# Patient Record
Sex: FEMALE | Race: BLACK OR AFRICAN AMERICAN | NOT HISPANIC OR LATINO | Employment: FULL TIME | URBAN - METROPOLITAN AREA
[De-identification: names, ages, dates, MRNs, and addresses within clinical notes are randomized per-mention and may not be internally consistent; named-entity substitution may affect disease eponyms.]

---

## 2019-05-29 PROBLEM — R00.2 PALPITATIONS: Status: ACTIVE | Noted: 2019-05-29

## 2019-05-29 PROBLEM — F41.9 ANXIETY: Status: ACTIVE | Noted: 2019-05-29

## 2019-05-29 PROBLEM — Z86.79: Status: ACTIVE | Noted: 2019-05-29

## 2019-05-29 PROBLEM — I34.0 MITRAL VALVE INSUFFICIENCY: Status: ACTIVE | Noted: 2019-05-29

## 2019-05-29 PROBLEM — B00.9 HSV INFECTION: Status: ACTIVE | Noted: 2019-05-29

## 2019-06-03 PROBLEM — M79.641 HAND PAIN, RIGHT: Status: ACTIVE | Noted: 2019-06-03

## 2020-02-12 PROBLEM — F32.A DEPRESSION: Status: ACTIVE | Noted: 2020-02-12

## 2020-03-12 PROBLEM — Z90.710 STATUS POST TOTAL ABDOMINAL HYSTERECTOMY: Status: ACTIVE | Noted: 2018-04-16

## 2020-10-16 PROBLEM — Z98.890 STATUS POST MITRAL VALVE ANNULOPLASTY: Chronic | Status: ACTIVE | Noted: 2020-10-16

## 2020-10-16 PROBLEM — Z98.890 STATUS POST MITRAL VALVE ANNULOPLASTY: Status: ACTIVE | Noted: 2020-10-16

## 2021-04-21 DIAGNOSIS — Z11.59 NEED FOR HEPATITIS C SCREENING TEST: ICD-10-CM

## 2021-05-12 ENCOUNTER — PATIENT MESSAGE (OUTPATIENT)
Dept: RESEARCH | Facility: HOSPITAL | Age: 41
End: 2021-05-12

## 2022-02-03 PROBLEM — M79.641 HAND PAIN, RIGHT: Status: RESOLVED | Noted: 2019-06-03 | Resolved: 2022-02-03

## 2022-03-24 PROBLEM — N83.291 COMPLEX CYST OF BOTH OVARIES: Status: ACTIVE | Noted: 2022-03-24

## 2022-03-24 PROBLEM — N83.292 COMPLEX CYST OF BOTH OVARIES: Status: ACTIVE | Noted: 2022-03-24

## 2022-08-01 PROBLEM — N83.299 COMPLEX OVARIAN CYST: Status: ACTIVE | Noted: 2022-08-01

## 2023-09-20 PROBLEM — N83.291 COMPLEX CYST OF BOTH OVARIES: Status: RESOLVED | Noted: 2022-03-24 | Resolved: 2023-09-20

## 2023-09-20 PROBLEM — N83.292 COMPLEX CYST OF BOTH OVARIES: Status: RESOLVED | Noted: 2022-03-24 | Resolved: 2023-09-20

## 2023-09-20 PROBLEM — R10.2 PELVIC PAIN: Status: ACTIVE | Noted: 2023-09-20

## 2024-03-11 ENCOUNTER — PATIENT MESSAGE (OUTPATIENT)
Dept: ADMINISTRATIVE | Facility: HOSPITAL | Age: 44
End: 2024-03-11

## 2024-03-14 ENCOUNTER — PATIENT MESSAGE (OUTPATIENT)
Dept: ADMINISTRATIVE | Facility: HOSPITAL | Age: 44
End: 2024-03-14

## 2024-03-14 ENCOUNTER — PATIENT OUTREACH (OUTPATIENT)
Dept: ADMINISTRATIVE | Facility: HOSPITAL | Age: 44
End: 2024-03-14

## 2024-03-14 DIAGNOSIS — Z12.31 BREAST CANCER SCREENING BY MAMMOGRAM: Primary | ICD-10-CM

## 2024-12-12 ENCOUNTER — ON-DEMAND VIRTUAL (OUTPATIENT)
Dept: URGENT CARE | Facility: CLINIC | Age: 44
End: 2024-12-12

## 2024-12-12 DIAGNOSIS — N89.8 VAGINAL IRRITATION: Primary | ICD-10-CM

## 2024-12-12 RX ORDER — FLUCONAZOLE 150 MG/1
150 TABLET ORAL DAILY
Qty: 2 TABLET | Refills: 0 | Status: SHIPPED | OUTPATIENT
Start: 2024-12-12 | End: 2024-12-14

## 2024-12-12 NOTE — PROGRESS NOTES
Subjective:      Patient ID: Maya Farmer is a 44 y.o. female.    Vitals:  vitals were not taken for this visit.     Chief Complaint: vaginal irritation and Vaginal Discharge      Visit Type: TELE AUDIOVISUAL    Present with the patient at the time of consultation: TELEMED PRESENT WITH PATIENT: None, patient at home    Past Medical History:   Diagnosis Date    Allergy     Anemia     Anxiety     Herpes genitalis in women     Hypertension     Mitral regurgitation     Right tubal pregnancy 05/2006     Past Surgical History:   Procedure Laterality Date    ASD REPAIR      CARDIAC SURGERY      first two surgeries at age 13, last at age 31    HYSTERECTOMY  2018    secondary to bleeding with neg patho    MITRAL VALVE ANNULOPLASTY      TUBAL LIGATION      2004     Review of patient's allergies indicates:  No Known Allergies  Current Outpatient Medications on File Prior to Visit   Medication Sig Dispense Refill    acyclovir (ZOVIRAX) 400 MG tablet Take 1 tablet (400 mg total) by mouth 2 (two) times daily. 180 tablet 1    aspirin (ECOTRIN) 81 MG EC tablet Take 81 mg by mouth once daily.      cetirizine (ZYRTEC) 10 MG tablet Take 1 tablet (10 mg total) by mouth once daily. 30 tablet 11    EPINEPHrine (EPIPEN) 0.3 mg/0.3 mL AtIn Inject 0.3 mLs (0.3 mg total) into the muscle once. for 1 dose 1 each 0    fluticasone propionate (FLONASE) 50 mcg/actuation nasal spray 1 spray (50 mcg total) by Each Nostril route 2 (two) times a day. 16 g 11    gabapentin (NEURONTIN) 300 MG capsule Take 1 capsule (300 mg total) by mouth 3 (three) times daily. 90 capsule 5    ibuprofen (ADVIL,MOTRIN) 800 MG tablet Take 1 tablet (800 mg total) by mouth 3 (three) times daily as needed for Pain. 90 tablet 5    metoprolol tartrate (LOPRESSOR) 25 MG tablet Take 1 tablet (25 mg total) by mouth once daily. 90 tablet 1    norethindrone (MICRONOR) 0.35 mg tablet TAKE 1 TABLET(0.35 MG) BY MOUTH EVERY DAY 28 tablet 11    potassium chloride SA  (K-DUR,KLOR-CON) 20 MEQ tablet Take 1 tablet (20 mEq total) by mouth once daily. 30 tablet 5     No current facility-administered medications on file prior to visit.     Family History   Problem Relation Name Age of Onset    Hypertension Mother jil     Cervical cancer Mother jil     Hypertension Father demarcus     Cancer Paternal Grandmother      Breast cancer Paternal Grandmother          50's    Cancer Paternal Grandfather      Colon cancer Neg Hx      Ovarian cancer Neg Hx      Endometrial cancer Neg Hx      Pancreatic cancer Neg Hx         Medications Ordered                [id number] [place] - [address] AT [facility]   [address] [place] [address] [place], [place] [address]    Telephone: [phone]   Fax: [phone]   Hours: Not open 24 hours                         E-Prescribed (1 of 1)              fluconazole (DIFLUCAN) 150 MG Tab    Sig: Take 1 tablet (150 mg total) by mouth once daily. Take 1 tablet as a single dose. If symptoms persist, may repeat a second dose in 72 hours. for 2 days       Start: 12/12/24     Quantity: 2 tablet Refills: 0                           Ohs Peq Odvv Intake    12/12/2024  1:45 PM CST - Filed by Patient   What is your current physical address in the event of a medical emergency? [address] [place] [address]   Are you able to take your vital signs? No   Please attach any relevant images or files    Is your employer contracted with [facility]? No         Vaginal irritation and discharge, onset today. No odor. +itching. No rash or swelling. No dysuria. No other associated symptoms to report. Similar presentation to previous yeast infections.    Vaginal Discharge  The patient's primary symptoms include vaginal discharge. The patient's pertinent negatives include no pelvic pain. Pertinent negatives include no abdominal pain, back pain or dysuria.       Gastrointestinal:  Negative for abdominal  pain.   Genitourinary:  Positive for vaginal discharge. Negative for dysuria, vaginal odor and pelvic pain.   Musculoskeletal:  Negative for back pain.   Allergic/Immunologic: Positive for itching (vaginal).        Objective:   The physical exam was conducted virtually.  Physical Exam   Constitutional: She is oriented to person, place, and time. She does not appear ill. No distress.   HENT:   Head: Normocephalic and atraumatic.   Nose: Nose normal.   Eyes: Extraocular movement intact   Pulmonary/Chest: Effort normal.   Abdominal: Normal appearance.   Musculoskeletal: Normal range of motion.         General: Normal range of motion.   Neurological: no focal deficit. She is alert and oriented to person, place, and time.   Psychiatric: Her behavior is normal. Mood normal.   Vitals reviewed.      Assessment:     1. Vaginal irritation        Plan:   Patient encouraged to monitor symptoms closely and instructed to follow-up for new or worsening symptoms. Further, in-person, evaluation may be necessary for continued treatment. Please follow up with your primary care doctor or specialist as needed. Verbally discussed plan. Patient confirms understanding and is in agreement with treatment and plan.     You must understand that you've received a Virtual Care evaluation only and that you may be released before all your medical problems are known or treated. You, the patient, will arrange for follow up care as instructed.      Vaginal irritation  -     fluconazole (DIFLUCAN) 150 MG Tab; Take 1 tablet (150 mg total) by mouth once daily. Take 1 tablet as a single dose. If symptoms persist, may repeat a second dose in 72 hours. for 2 days  Dispense: 2 tablet; Refill: 0        Patient Instructions   Patient Education       Vaginal Yeast Infection Discharge Instructions   About this topic   Yeast infections are caused by a germ called a fungus. The germs live almost everywhere on your body. The germs grow best in dark moist areas of  "your body like the vagina. Yeast germs also grow on your skin. Most often, your immune system can control the amount of yeast and you stay healthy. If you are sick, the yeast can multiply and cause an infection. An infection in your vagina can cause very bad itching. You may also have a discharge that looks like cottage cheese. You are more likely to get a yeast infection if you are:   On steroids or antibiotics  Pregnant  A diabetic or have high blood sugar  On birth control pills  A woman who uses feminine washes or douches  Not taking good care of your skin and keeping your skin clean  A person with problems with the immune system  What care is needed at home?   Ask your doctor what you need to do when you go home. Make sure you ask questions if you do not understand what you need to do.  Practice good hygiene. Wash often with soap and water. Take a shower instead of a bath. Avoid bubble baths. Pat dry with a clean towel.  Keep moist areas of the body clean, cool, and dry.  Do not use douches or feminine sprays.  Wear cotton underwear. Avoid tight-fitting pants or shorts.  Change your wet bathing suit or damp workout clothes as soon as possible.  Avoid sexual contact until both you and your partner are free of infection.  Always wipe from front to back after going to the restroom.  What follow-up care is needed?   Your doctor may ask you to make visits to the office to check on your progress. Be sure to keep your visits.  What drugs may be needed?   The doctor may order drugs to:  Help itching  Fight an infection  Take your drugs as ordered. Do not stop until your doctor tells you to do so.  Will physical activity be limited?   Pain and itching may cause you to limit your activities for a short while.  What changes to diet are needed?   If you have diabetes, control your blood sugar.  Ask your doctor about eating yogurt "with active cultures" if on antibiotic therapy.  Avoid beer, wine, and mixed drinks (alcohol) " when taking drugs to treat a yeast infection.  What problems could happen?   Long-term infection or the infection comes back  Another infection with a different kind of germ  When do I need to call the doctor?   Signs of infection such as a fever of 100.4°F (38°C) or higher, chills, pain with passing urine, or mouth sores  An itching, red, moist skin rash, or cracks in the skin of the vagina  Pain in your mouth or throat with white patches (thrush)  Itchy vaginal discharge  You are not feeling better in 2 to 3 days or you are feeling worse  Teach Back: Helping You Understand   The Teach Back Method helps you understand the information we are giving you. After you talk with the staff, tell them in your own words what you learned. This helps to make sure the staff has described each thing clearly. It also helps to explain things that may have been confusing. Before going home, make sure you can do these:  I can tell you about my condition.  I can tell you what may help keep me from getting a vaginal infection again.  I can tell you what I will do if I have a fever, chills, itching, a rash, or vaginal discharge.  Where can I learn more?   Centers for Disease Control and Prevention  https://www.cdc.gov/fungal/diseases/candidiasis/genital/index.html   FamilyDoctor.org  http://familydoctor.org/familydoctor/en/diseases-conditions/yeast-infections.html   Women's Health  http://www.womenshealth.gov/publications/our-publications/fact-sheet/vaginal-yeast-infections.html   Last Reviewed Date   2019-11-26  Consumer Information Use and Disclaimer   This information is not specific medical advice and does not replace information you receive from your health care provider. This is only a brief summary of general information. It does NOT include all information about conditions, illnesses, injuries, tests, procedures, treatments, therapies, discharge instructions or life-style choices that may apply to you. You must talk with your  health care provider for complete information about your health and treatment options. This information should not be used to decide whether or not to accept your health care providers advice, instructions or recommendations. Only your health care provider has the knowledge and training to provide advice that is right for you.  Copyright   Copyright © 2021 [employer] and its affiliates and/or licensors. All rights reserved.